# Patient Record
Sex: FEMALE | Race: BLACK OR AFRICAN AMERICAN | URBAN - METROPOLITAN AREA
[De-identification: names, ages, dates, MRNs, and addresses within clinical notes are randomized per-mention and may not be internally consistent; named-entity substitution may affect disease eponyms.]

---

## 2017-03-17 ENCOUNTER — HOSPITAL ENCOUNTER (OUTPATIENT)
Dept: PHYSICAL THERAPY | Age: 72
Discharge: HOME | End: 2017-03-17
Payer: MEDICAID

## 2017-03-17 PROCEDURE — G8979 MOBILITY GOAL STATUS: HCPCS

## 2017-03-17 PROCEDURE — 97110 THERAPEUTIC EXERCISES: CPT

## 2017-03-17 PROCEDURE — 97162 PT EVAL MOD COMPLEX 30 MIN: CPT

## 2017-03-17 PROCEDURE — G8978 MOBILITY CURRENT STATUS: HCPCS

## 2017-03-17 NOTE — PROGRESS NOTES
[address] [facility]  PHYSICAL THERAPY AT [facility] [address], [address] - Phone: [phone]  Fax: [phone] / [address] [facility]  Patient Name: Mehnaz Iverson : 1945   Medical   Diagnosis: Right knee pain [M25.561] Treatment Diagnosis: Right knee pain [M25.561]   Onset Date: 17     Referral Source: Hilaria Holloway,  Start of Care [facility]): 3/17/2017   Prior Hospitalization: See medical history Provider #: [id number]   Prior Level of Function: Chronic R knee pain, ambulating with rollator   Comorbidities: Depression, Diabetes, HBP   Medications: Verified on Patient Summary List   The Plan of Care and following information is based on the information from the initial evaluation.   ===========================================================================================  Assessment / key information:  Pt is a 65 y/o female who presents s/p R TKA 17 followed by HHPT until 3/15/17. She presents wearing immobilizer brace which her daughter reports she was told to wear due to the tendency of the knee to \"bow out. \" Pain ranges from 0-5/10 made worse after prolonged sitting and eased with ice. 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 She had the left hip replaced in , and now wears a heel lift in that shoe due to leg length discrepancy. Upon objective evaluation, pt demonstrates 8 cm swelling in R knee joint, decreased R knee A/PROM ( -8/-6 to 77/95), ambulating with rollator with forward flexed posture and short step length B, decreased strength L hip flex and R knee ext, hypomobile R patella, and tight B hamstrings. Pt requires supervision assistance for safe bed mobility and SBA for transfers and gait. Functional deficits include: walking, car transfers, and steps.  A home exercise program was demonstrated and provided to address the above objective and functional deficits. Pt would benefit from PT to address these deficits for increased functional mobility and QOL.  ===========================================================================================  Eval Complexity: History HIGH Complexity :3+ comorbidities / personal factors will impact the outcome/ POC ;  Examination  HIGH Complexity : 4+ Standardized tests and measures addressing body structure, function, activity limitation and / or participation in recreation ; Presentation MEDIUM Complexity : Evolving with changing characteristics ; Decision Making MEDIUM Complexity : FOTO score of 26-74; Overall Complexity MEDIUM  Problem List: pain affecting function, decrease ROM, decrease strength, edema affecting function, impaired gait/ balance, decrease ADL/ functional abilitiies, decrease activity tolerance, decrease flexibility/ joint mobility and decrease transfer abilities   Treatment Plan may include any combination of the following: Therapeutic exercise, Therapeutic activities, Neuromuscular re-education, Physical agent/modality, Gait/balance training, Manual therapy, Patient education, Self Care training, Functional mobility training, Home safety training and Stair training  Patient / Family readiness to learn indicated by: asking questions, trying to perform skills and interest  Persons(s) to be included in education: patient (P), patient's daughter, patient's nurse  Barriers to Learning/Limitations: None  Measures taken:    Patient Goal (s): \"To get better. \"   Patient self reported health status: good  Rehabilitation Potential: good   Short Term Goals: To be accomplished in  3  weeks:  1. Establish initial HEP and pt compliant with instructions  2. Pt will demonstrate R knee AROM -4 to 90 degrees     Long Term Goals: To be accomplished in  6  weeks:  1. Increase FOTO score to 47 indicating improved function  2. Pt will demonstrate R knee AROM 0-110  3.  Pt will ambulate 500 feet independently using rollator walker for community ambulation  4. Pt will be able to negotiate 3 steps independently   5. Pt will be able to transfer into and out of the car independently     Frequency / Duration:   Patient to be seen  2-3  times per week for 6  weeks:  Patient / Caregiver education and instruction: activity modification and exercises  G-Codes (GP): n/a  Therapist Signature: Ave Truong PT, DPT Date: 5/47/3577   Certification Period: 3/17/17-6/16/17 Time: 3:06 PM   ===========================================================================================  I certify that the above Physical Therapy Services are being furnished while the patient is under my care. I agree with the treatment plan and certify that this therapy is necessary. Physician Signature:        Date:       Time:     Please sign and return to [facility] or you may fax the signed copy to [phone]. Thank you.

## 2017-03-17 NOTE — PROGRESS NOTES
PHYSICAL THERAPY - DAILY TREATMENT NOTE    Patient Name: Lissette Peters        Date: 3/17/2017  : 1945    Patient  Verified: YES  Visit #:     Insurance: Payor: [address] [place] [address] / Plan:     / Product Type: Medicaid /      In time: 2:15 Out time: 3:00   Total Treatment Time: 45     Medicare Time Tracking (below)   Total Timed Codes (min):   1:1 Treatment Time:       TREATMENT AREA/ DIAGNOSIS = Right knee pain [M25.561]    SUBJECTIVE  Pain Level (on 0 to 10 scale):  0  / 10   Medication Changes/New allergies or changes in medical history, any new surgeries or procedures? NO    If yes, update Summary List   Subjective Functional Status/Changes:  []  No changes reported   Pt is a 65 y/o female who presents s/p R TKA 17 followed by HHPT until 3/15/17. She presents wearing immobilizer brace which her daughter reports she was told to wear due to the tendency of the knee to \"bow out. \" Pain ranges from 0-5/10 made worse after prolonged sitting and eased with ice. 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 She had the left hip replaced in , and now wears a heel lift in that shoe due to leg length discrepancy. Prior Level of Function: ambulating with rollator; chronic R knee pain  Current Functional Deficits: walking, car transfers, steps  Pt Goal: \"To get better. \"     OBJECTIVE  Physical Therapy Evaluation - Knee    Posture: [] Varus    [] Valgus    [] Recurvatum        [] Tibial Torsion    [] Foot Supination    [] Foot Pronation    Describe:    Girth Measurements:     Cm at  Cm above joint line   Cm at   Cm below joint line  Cm at joint line   Left     36   Right      44        Gait:  [] Normal    [] Abnormal    [] Antalgic    [] NWB    Device:    Describe: forward flexed, rollator, short step length B    ROM / Strength  [] Unable to assess                  AROM                      PROM Strength (1-5)    Left Right Left Right Left Right   Hip Flexion     2+ 4    Extension          Abduction     5 5    Adduction         Knee Flexion 105 77 120 95 5 5    Extension 0 -8   5 2+   Ankle Plantarflexion          Dorsiflexion     5 5    all strength measured in sitting    Accessory Mobility    Range  End feel    Ant Glide      Post Glide      R A/P unicondylar      L A/P unicondylar      R P/A unicondylar      L P/A unicondylar      Patella      Sup      Inf      Med      Lat  hypo        Flexibility:   Blaise Test:    SLR: tight    Gastroc/Soleus:  Other:    Palpation:     Mild/Mod/Severe  Mild/Mod/Severe   Med. Joint Line  Quad Tendon    Lat. Joint Line  Fibular Head    Tibial Tubercle  Hamstring Tendons    Patella  Pes Anserine    Patellar Lig.   Gerdy's Tubercle      Optional Tests:  Lachmans  [] Neg    [] Pos Posterior Drawer [] Neg    [] Pos  Pivot Shift  [] Neg    [] Pos Posterior Sag  [] Neg    [] Pos  EMILIANA   [] Neg    [] Pos Iza's Test [] Neg    [] Pos  ALRI   [] Neg    [] Pos Squat   [] Neg    [] Pos  Valgus@ 0 Degrees [] Neg    [] Pos Lowell-Josh [] Neg    [] Pos  Valgus@ 30 Degrees [] Neg    [] Pos Patellar Apprehension [] Neg    [] Pos  Varus@ 0 Degrees [] Neg    [] Pos Knowles's Compression [] Neg    [] Pos  Varus@ 30 Degrees [] Neg    [] Pos Ely's Test  [] Neg    [] Pos  Apley's Compression [] Neg    [] Pos Grabiel's Test  [] Neg    [] Pos  Apley's Distraction [] Neg    [] Pos Stroke Test  [] Neg    [] Pos   Anterior Drawer [] Neg    [] Pos Fluctuation Test [] Neg    [] Pos  Other:                  [] Neg    [] Pos               Supervision assist for bed mobility  SBA for ambulation and transfers    OBJECTIVE TREATMENT  Modalities Rationale: [] decrease edema/ inflammation  [] decrease pain   [] increase tissue extensibility  [] increase muscle performance  [] decrease neural compromise  to improve patient's ability to [] perform ADLs   [] ambulate  [] perform work  [] relaxation/ sleep      min [] Estim, type/location:                                      []  att     []  unatt     []  w/US     []  w/ice    []  w/heat    min []  Mechanical Traction: type/lbs                   []  pro   []  sup   []  int   []  cont    []  before manual    []  after manual    min []  Ultrasound, settings/location:      min []  Iontophoresis w/ dexamethasone, location:                                               []  take home patch       []  in clinic    min []  Ice     []  Heat    location/position:     min []  Vasopneumatic Device, press/temp:     min []  Other:    [] Skin assessment post-treatment (if applicable):    []  intact    []  redness- no adverse reaction     []redness  adverse reaction:         10 min Therapeutic Exercise:  [x]  See flow sheet   Rationale:  [x] increase ROM   [x] increase strength   [] increase endurance   [] increase motor control   [] other  to improve patients ability to [x] perform ADLs   [x] ambulate  [] perform work  [] relaxation/ sleep       min Manual Therapy: Technique:          Rationale: [] decrease pain   [] decrease TP [] increase ROM/ mobility   [] increase tissue extensibility   [] decrease edema   [] reduce disc [] postural correction   [] other     to improve patient's ability to [] perform ADLs   [] ambulate  [] perform work  [] relaxation/ sleep       min Therapeutic Activity:  [] see flow sheet   Rationale:[] increase ROM   [] increase strength   [] increase balance/ proprioception   [] increase motor control   [] other   to improve patients ability to [] perform ADLs   [] ambulate  [] perform work  [] relaxation/ sleep      min Neuromuscular Re-ed:  [] see flow sheet   Rationale:[] increase ROM   [] increase strength   [] increase balance/ proprioception   [] increase motor control  [] improve safety  [] other    to improve patients ability to[] perform ADLs   [] ambulate  [] perform work  [] relaxation/ sleep                min Gait Training: To improve patients ability to:    [] perform ADLs   [] ambulate       min Patient Education:  Yes    [x] Reviewed HEP   []  Progressed/Changed HEP based on: Other Objective/Functional Measures:       Post Treatment Pain Level (on 0 to 10) scale:   0  / 10     ASSESSMENT  []  See Progress Note/Recertification      Patient will continue to benefit from skilled PT services to modify and progress therapeutic interventions, address functional mobility deficits, address ROM deficits, address strength deficits, analyze and address soft tissue restrictions, analyze and cue movement patterns, analyze and modify body mechanics/ergonomics, assess and modify postural abnormalities and instruct in home and community integration to attain remaining goals. Progress toward goals / Updated goals:    [] decr pain   []inc stability   []inc balance [] inc ROM[] inc strength  [] centralizing symptoms                    [] progressing  Function  [] progressing towards LTGs            []  progressing HEP       PLAN  [x]  Upgrade activities as tolerated YES Continue plan of care   []  Discharge due to :    []  Other:      Therapist: Luis Valerio PT    Date: 3/17/2017 Time: 2:14 PM        No future appointments.

## 2017-03-20 ENCOUNTER — HOSPITAL ENCOUNTER (OUTPATIENT)
Dept: PHYSICAL THERAPY | Age: 72
Discharge: HOME | End: 2017-03-20
Payer: MEDICAID

## 2017-03-20 PROCEDURE — 97110 THERAPEUTIC EXERCISES: CPT

## 2017-03-20 PROCEDURE — 97140 MANUAL THERAPY 1/> REGIONS: CPT

## 2017-03-20 NOTE — PROGRESS NOTES
PHYSICAL THERAPY - DAILY TREATMENT NOTE    Patient Name: Unknown Half        Date: 3/20/2017  : 1945   YES Patient  Verified  Visit #:   2   of   12  Insurance: Payor: [place] / Plan: 1500 / Product Type: Medicaid /      In time: 10 Out time: 11   Total Treatment Time: 60     TREATMENT AREA =  Right knee pain [M25.561]    SUBJECTIVE  Pain Level (on 0 to 10 scale):  0  / 10   Medication Changes/New allergies or changes in medical history, any new surgeries or procedures?     NO    If yes, update Summary List   Subjective Functional Status/Changes:  []  No changes reported     Functional improvements: can walker farther  Functional impairments: swelling, knee extension        OBJECTIVE  Modalities Rationale:     decrease edema, decrease inflammation and decrease pain to improve patient's ability to prolong walking   min [] Estim, type/location:                                      []  att     []  unatt     []  w/US     []  w/ice    []  w/heat    min []  Mechanical Traction: type/lbs                   []  pro   []  sup   []  int   []  cont    []  before manual    []  after manual    min []  Ultrasound, settings/location:      min []  Iontophoresis w/ dexamethasone, location:                                               []  take home patch       []  in clinic    min []  Ice     []  Heat    location/position:     min []  Vasopneumatic Device, press/temp:     min []  Other:    [] Skin assessment post-treatment (if applicable):    []  intact    []  redness- no adverse reaction     []redness  adverse reaction:        15 min Manual Therapy: Technique:      [] S/DTM []IASTM []PROM [] Passive Stretching   []manual TPR    []Jt manipulation:Gr I [] II []  III [] IV[] V[]  Treatment Area:     Rationale:      decrease pain, increase ROM and increase tissue extensibility to improve patient's ability to prolong walking     45 min Therapeutic Exercise:  [x]  See flow sheet   Rationale: increase ROM, increase strength, improve coordination and improve balance to improve the patients ability to prolong walking         min Neuromuscular Re-ed:    Rationale:    increase ROM to improve the patients ability to          min Therapeutic Activity:    Rationale:    increase ROM to improve the patients ability to         min Gait Training:    Rationale:         min Patient Education:  YES  Reviewed HEP   []  Progressed/Changed HEP based on: Other Objective/Functional Measures: Added heel prop, supine hip abd, glute sets, ROM, MSR  Gait training for knee ext, heel strike      Post Treatment Pain Level (on 0 to 10) scale:   0  / 10     ASSESSMENT  Assessment/Changes in Function:     Patient tolerated treatment well today     []  See Progress Note/Recertification   Patient will continue to benefit from skilled PT services to modify and progress therapeutic interventions, address functional mobility deficits, address ROM deficits, address strength deficits and analyze and address soft tissue restrictions to attain remaining goals.    Progress toward goals / Updated goals:    Established initial HEP     PLAN  []  Upgrade activities as tolerated YES Continue plan of care   []  Discharge due to :    []  Other:      Therapist: Andria Burleson PTA    Date: 3/20/2017 Time: 3:12 PM     Future Appointments  Date Time Provider Michael Jesus   3/22/2017 12:00 PM Jayme Mahoney PT [facility]   3/27/2017 12:00 PM Andria Burleson PTA [facility]   3/29/2017 10:30 AM Andria Burleson PTA [facility]   3/31/2017 1:30 PM Andria Burleson PTA [facility]   4/3/2017 11:00 AM Jayme Mahoney PT [facility]   4/5/2017 11:30 AM Andria Burleson PTA [facility]   4/7/2017 12:00 PM Jayme Mahoney PT [facility]   4/10/2017 12:30 PM Jayme Maohney PT [facility]   4/12/2017 11:30 AM Andria Burleson PTA [facility]   4/14/2017 12:00 PM Stratford Harps, PT [facility] AT [facility]

## 2017-03-22 ENCOUNTER — HOSPITAL ENCOUNTER (OUTPATIENT)
Dept: PHYSICAL THERAPY | Age: 72
Discharge: HOME | End: 2017-03-22
Payer: MEDICAID

## 2017-03-22 PROCEDURE — 97140 MANUAL THERAPY 1/> REGIONS: CPT

## 2017-03-22 PROCEDURE — 97110 THERAPEUTIC EXERCISES: CPT

## 2017-03-22 NOTE — PROGRESS NOTES
PHYSICAL THERAPY - DAILY TREATMENT NOTE      Patient Name: Gal Cruz        Date: 3/22/2017  : 1945   YES Patient  Verified  Visit #:   3   of     Insurance: Payor: [employer] / Plan: [employer] / Product Type: Managed Care Medicaid /      In time: 12:00 Out time: 1:00   Total Treatment Time: 60     Medicare Time Tracking (below)   Total Timed Codes (min):   1:1 Treatment Time:       TREATMENT AREA = Right knee pain [M25.561]    SUBJECTIVE    Pain Level (on 0 to 10 scale):  0  / 10   Medication Changes/New allergies or changes in medical history, any new surgeries or procedures? NO    If yes, update Summary List   Subjective Functional Status/Changes:  []  No changes reported     \"[place] No pain today or after last time. \"       OBJECTIVE    35 min Therapeutic Exercise:  [x]  See flow sheet   Rationale:      increase ROM, increase strength, improve coordination, improve balance and increase proprioception to improve the patients ability to walk       15 min Manual Therapy:  DTM R hamstrings, gastroc, quad, patellar mobs, PROM flex and ext, retrograde massage   Rationale:      decrease pain, increase ROM and increase tissue extensibility to improve patient's ability to walk    Modalities Rationale:     decrease edema and decrease pain to improve patient's ability to walk      min [] Estim, type/location:                                      []  att     []  unatt     []  w/US     []  w/ice    []  w/heat    min []  Mechanical Traction: type/lbs                   []  pro   []  sup   []  int   []  cont    []  before manual    []  after manual    min []  Ultrasound, settings/location:      min []  Iontophoresis w/ dexamethasone, location:                                               []  take home patch       []  in clinic   10 min [x]  Ice     []  Heat    location/position: R knee; supine    min []  Vasopneumatic Device, press/temp:     min []  Other:    [] Skin assessment post-treatment (if applicable):    [x]  intact    [x]  redness- no adverse reaction     []redness  adverse reaction:       min Patient Education:  YES  Reviewed HEP   []  Progressed/Changed HEP based on: Other Objective/Functional Measures:    Extension slightly improved from initial eval     Post Treatment Pain Level (on 0 to 10) scale:   0  / 10     ASSESSMENT    Assessment/Changes in Function:     Pt continues to position R hip in ER making knee ext appear greater than it actually is. Verbal cues to correct this during all exercises. []  See Progress Note/Recertification   Patient will continue to benefit from skilled PT services to modify and progress therapeutic interventions, address functional mobility deficits, address ROM deficits, address strength deficits, analyze and address soft tissue restrictions, analyze and cue movement patterns, analyze and modify body mechanics/ergonomics, assess and modify postural abnormalities and instruct in home and community integration to attain remaining goals. to attain remaining goals.    Progress toward goals / Updated goals:    Compliant with exercises and walking at home with nurse     PLAN    []  Upgrade activities as tolerated YES Continue plan of care   []  Discharge due to :    []  Other:      Therapist: Luis Valerio PT    Date: 3/22/2017 Time: 12:04 PM   Future Appointments  Date Time Provider Michael Jesus   3/27/2017 12:00 PM Dollene Reusing, PTA [facility]   3/29/2017 10:30 AM Dollene Reusing, PTA [facility]   3/31/2017 1:30 PM Dollene Reusing, PTA [facility]   4/3/2017 11:00 AM Luis Valerio, PT [facility]   4/5/2017 11:30 AM Dollene Reusing, PTA [facility]   4/7/2017 12:00 PM Luis Valerio PT [facility]   4/10/2017 12:30 PM Luis Valerio PT [facility]   4/12/2017 11:30 AM Dollene Reusing, PTA [facility]   4/14/2017 12:00 PM Luis Valerio, PT [facility]

## 2017-03-27 ENCOUNTER — HOSPITAL ENCOUNTER (OUTPATIENT)
Dept: PHYSICAL THERAPY | Age: 72
Discharge: HOME | End: 2017-03-27
Payer: MEDICAID

## 2017-03-27 PROCEDURE — 97110 THERAPEUTIC EXERCISES: CPT

## 2017-03-27 PROCEDURE — 97112 NEUROMUSCULAR REEDUCATION: CPT

## 2017-03-27 PROCEDURE — 97140 MANUAL THERAPY 1/> REGIONS: CPT

## 2017-03-27 NOTE — PROGRESS NOTES
PHYSICAL THERAPY - DAILY TREATMENT NOTE    Patient Name: Mehnaz Iverson        Date: 3/27/2017  : 1945   YES Patient  Verified  Visit #:     Insurance: Payor: BLUE CROSS MEDICAID / Plan: [facility] HEALTHKEEPERS PLUS / Product Type: Managed Care Medicaid /      In time: 12 Out time: 2456   Total Treatment Time: 55     TREATMENT AREA =  Right knee pain [M25.561]    SUBJECTIVE  Pain Level (on 0 to 10 scale):  0  / 10   Medication Changes/New allergies or changes in medical history, any new surgeries or procedures?     NO    If yes, update Summary List   Subjective Functional Status/Changes:  []  No changes reported     Functional improvements: can walker farther  Functional impairments: My left hip has been bothering me       OBJECTIVE  Modalities Rationale:     decrease edema, decrease inflammation and decrease pain to improve patient's ability to prolong walking   min [] Estim, type/location:                                      []  att     []  unatt     []  w/US     []  w/ice    []  w/heat    min []  Mechanical Traction: type/lbs                   []  pro   []  sup   []  int   []  cont    []  before manual    []  after manual    min []  Ultrasound, settings/location:      min []  Iontophoresis w/ dexamethasone, location:                                               []  take home patch       []  in clinic    min []  Ice     []  Heat    location/position:     min []  Vasopneumatic Device, press/temp:     min []  Other:    [] Skin assessment post-treatment (if applicable):    []  intact    []  redness- no adverse reaction     []redness  adverse reaction:        15 min Manual Therapy: Technique:      [] S/DTM []IASTM []PROM [] Passive Stretching   []manual TPR    []Jt manipulation:Gr I [] II []  III [] IV[] V[]  Treatment Area:     Rationale:      decrease pain, increase ROM and increase tissue extensibility to improve patient's ability to prolong walking     45 min Therapeutic Exercise:  [x] See flow sheet   Rationale:      increase ROM, increase strength, improve coordination and improve balance to improve the patients ability to prolong walking         min Neuromuscular Re-ed:    Rationale:    increase ROM to improve the patients ability to          min Therapeutic Activity:    Rationale:    increase ROM to improve the patients ability to         min Gait Training:    Rationale:         min Patient Education:  YES  Reviewed HEP   []  Progressed/Changed HEP based on: Other Objective/Functional Measures:    Improved knee extension since last visit   Slower feliberto today  Gait training for knee ext, heel strike      Post Treatment Pain Level (on 0 to 10) scale:   0  / 10     ASSESSMENT  Assessment/Changes in Function:     Patient tolerated treatment well today     []  See Progress Note/Recertification   Patient will continue to benefit from skilled PT services to modify and progress therapeutic interventions, address functional mobility deficits, address ROM deficits, address strength deficits and analyze and address soft tissue restrictions to attain remaining goals.    Progress toward goals / Updated goals:    Established initial HEP     PLAN  []  Upgrade activities as tolerated YES Continue plan of care   []  Discharge due to :    []  Other:      Therapist: Yobani Hernandez PTA    Date: 3/27/2017 Time: 3:12 PM     Future Appointments  Date Time Provider Michael Jesus   3/29/2017 10:30 AM Yobani Hernandez PTA [facility]   3/31/2017 1:30 PM Yobani Hernandez PTA [facility]   4/3/2017 11:00 AM Mary Gonsales PT [facility]   4/5/2017 11:30 AM Yobani Hernandez PTA [facility]   4/7/2017 12:00 PM Mary Gonsales PT [facility]   4/10/2017 12:30 PM Mary Gonsales PT [facility]   4/12/2017 11:30 AM Yobani Hernandez PTA [facility]   4/14/2017 12:00 PM Mary Gonsales PT [facility]

## 2017-03-29 ENCOUNTER — HOSPITAL ENCOUNTER (OUTPATIENT)
Dept: PHYSICAL THERAPY | Age: 72
Discharge: HOME | End: 2017-03-29
Payer: MEDICAID

## 2017-03-29 PROCEDURE — 97110 THERAPEUTIC EXERCISES: CPT

## 2017-03-29 PROCEDURE — 97112 NEUROMUSCULAR REEDUCATION: CPT

## 2017-03-29 PROCEDURE — 97140 MANUAL THERAPY 1/> REGIONS: CPT

## 2017-03-29 NOTE — PROGRESS NOTES
PHYSICAL THERAPY - DAILY TREATMENT NOTE    Patient Name: Shon Ybarra        Date: 3/29/2017  : 1945   YES Patient  Verified  Visit #:      12  Insurance: Payor: BLUE CROSS MEDICAID / Plan: [facility] HEALTHKEEPERS PLUS / Product Type: Managed Care Medicaid /      In time: 8524 Out time: 1140   Total Treatment Time: 50     TREATMENT AREA =  Right knee pain [M25.561]    SUBJECTIVE  Pain Level (on 0 to 10 scale):  0  / 10   Medication Changes/New allergies or changes in medical history, any new surgeries or procedures?     NO    If yes, update Summary List   Subjective Functional Status/Changes:  []  No changes reported     Functional improvements: can walk farther, left hip hasn't been bothering me as much   Functional impairments: My left hip still hurts sometimes        OBJECTIVE  Modalities Rationale:     decrease edema, decrease inflammation and decrease pain to improve patient's ability to prolong walking   min [] Estim, type/location:                                      []  att     []  unatt     []  w/US     []  w/ice    []  w/heat    min []  Mechanical Traction: type/lbs                   []  pro   []  sup   []  int   []  cont    []  before manual    []  after manual    min []  Ultrasound, settings/location:      min []  Iontophoresis w/ dexamethasone, location:                                               []  take home patch       []  in clinic    min []  Ice     []  Heat    location/position:     min []  Vasopneumatic Device, press/temp:     min []  Other:    [] Skin assessment post-treatment (if applicable):    []  intact    []  redness- no adverse reaction     []redness  adverse reaction:        15 min Manual Therapy: Technique:      [] S/DTM []IASTM []PROM [] Passive Stretching   []manual TPR    []Jt manipulation:Gr I [] II []  III [] IV[] V[]  Treatment Area:     Rationale:      decrease pain, increase ROM and increase tissue extensibility to improve patient's ability to prolong walking     35 min Therapeutic Exercise:  [x]  See flow sheet   Rationale:      increase ROM, increase strength, improve coordination and improve balance to improve the patients ability to prolong walking         min Neuromuscular Re-ed:    Rationale:    increase ROM to improve the patients ability to          min Therapeutic Activity:    Rationale:    increase ROM to improve the patients ability to         min Gait Training:    Rationale:         min Patient Education:  YES  Reviewed HEP   []  Progressed/Changed HEP based on: Other Objective/Functional Measures:    Improved knee extension since last visit   Slower feliberto today  Gait training for knee ext, heel strike      Post Treatment Pain Level (on 0 to 10) scale:   0  / 10     ASSESSMENT  Assessment/Changes in Function:     Patient tolerated treatment well today     []  See Progress Note/Recertification   Patient will continue to benefit from skilled PT services to modify and progress therapeutic interventions, address functional mobility deficits, address ROM deficits, address strength deficits and analyze and address soft tissue restrictions to attain remaining goals.    Progress toward goals / Updated goals:    Established initial HEP     PLAN  []  Upgrade activities as tolerated YES Continue plan of care   []  Discharge due to :    []  Other:      Therapist: Nirmala Horn PTA    Date: 3/29/2017 Time: 3:12 PM     Future Appointments  Date Time Provider Michael Jesus   3/31/2017 1:30 PM Nirmala Horn PTA [facility]   4/3/2017 11:00 AM La Nena Mcmanus PT [facility]   4/5/2017 11:30 AM Nirmala Horn PTA [facility]   4/7/2017 12:00 PM La Nena Mcmanus PT [facility]   4/10/2017 12:30 PM La Nena Mcmanus PT [facility]   4/12/2017 11:30 AM Nirmala Horn PTA [facility]   4/14/2017 12:00 PM La Nena Mcmanus PT [facility] AT [facility]

## 2017-03-31 ENCOUNTER — HOSPITAL ENCOUNTER (OUTPATIENT)
Dept: PHYSICAL THERAPY | Age: 72
Discharge: HOME | End: 2017-03-31
Payer: MEDICAID

## 2017-03-31 PROCEDURE — 97140 MANUAL THERAPY 1/> REGIONS: CPT

## 2017-03-31 PROCEDURE — 97112 NEUROMUSCULAR REEDUCATION: CPT

## 2017-03-31 PROCEDURE — 97110 THERAPEUTIC EXERCISES: CPT

## 2017-03-31 NOTE — PROGRESS NOTES
PHYSICAL THERAPY - DAILY TREATMENT NOTE    Patient Name: Carine Roque        Date: 3/31/2017  : 1945   YES Patient  Verified  Visit #:     Insurance: Payor: Maegna Or / Plan: [address] [place] [address] / Product Type: Managed Care Medicaid /      In time: 135 Out time: 235   Total Treatment Time: 60     TREATMENT AREA =  Right knee pain [M25.561]    SUBJECTIVE  Pain Level (on 0 to 10 scale):  0  / 10   Medication Changes/New allergies or changes in medical history, any new surgeries or procedures?     NO    If yes, update Summary List   Subjective Functional Status/Changes:  []  No changes reported     Functional improvements:   Functional impairments: My left hip still hurts sometimes        OBJECTIVE  Modalities Rationale:     decrease edema, decrease inflammation and decrease pain to improve patient's ability to prolong walking   min [] Estim, type/location:                                      []  att     []  unatt     []  w/US     []  w/ice    []  w/heat    min []  Mechanical Traction: type/lbs                   []  pro   []  sup   []  int   []  cont    []  before manual    []  after manual    min []  Ultrasound, settings/location:      min []  Iontophoresis w/ dexamethasone, location:                                               []  take home patch       []  in clinic   10 min [x]  Ice     []  Heat    location/position:     min []  Vasopneumatic Device, press/temp:     min []  Other:    [] Skin assessment post-treatment (if applicable):    []  intact    []  redness- no adverse reaction     []redness  adverse reaction:        15 min Manual Therapy: Technique:      [] S/DTM []IASTM []PROM [] Passive Stretching   []manual TPR    []Jt manipulation:Gr I [] II []  III [] IV[] V[]  Treatment Area:     Rationale:      decrease pain, increase ROM and increase tissue extensibility to improve patient's ability to prolong walking     35 min Therapeutic Exercise:  [x]  See flow sheet   Rationale:      increase ROM, increase strength, improve coordination and improve balance to improve the patients ability to prolong walking         min Neuromuscular Re-ed:    Rationale:    increase ROM to improve the patients ability to          min Therapeutic Activity:    Rationale:    increase ROM to improve the patients ability to         min Gait Training:    Rationale:         min Patient Education:  YES  Reviewed HEP   []  Progressed/Changed HEP based on: Other Objective/Functional Measures:    Improved knee extension since last visit   Slower feliberto today  Gait training for knee ext, heel strike      Post Treatment Pain Level (on 0 to 10) scale:   0  / 10     ASSESSMENT  Assessment/Changes in Function:     Patient tolerated treatment well today     []  See Progress Note/Recertification   Patient will continue to benefit from skilled PT services to modify and progress therapeutic interventions, address functional mobility deficits, address ROM deficits, address strength deficits and analyze and address soft tissue restrictions to attain remaining goals.    Progress toward goals / Updated goals:    Established initial HEP     PLAN  []  Upgrade activities as tolerated YES Continue plan of care   []  Discharge due to :    []  Other:      Therapist: Bartolo García PTA    Date: 3/31/2017 Time: 3:12 PM     Future Appointments  Date Time Provider Micahel Jesus   4/3/2017 11:00 AM Sosa Templeton PT [facility]   4/5/2017 11:30 AM Bartolo García PTA [facility]   4/7/2017 12:00 PM Sosa Templeton PT [facility]   4/10/2017 12:30 PM Sosa Templeton PT [facility]   4/12/2017 11:30 AM Bartolo García PTA [facility]   4/14/2017 12:00 PM Sosa Templeton PT [facility]

## 2017-04-03 ENCOUNTER — HOSPITAL ENCOUNTER (OUTPATIENT)
Dept: PHYSICAL THERAPY | Age: 72
Discharge: HOME | End: 2017-04-03
Payer: MEDICAID

## 2017-04-03 PROCEDURE — 97140 MANUAL THERAPY 1/> REGIONS: CPT

## 2017-04-03 PROCEDURE — 97110 THERAPEUTIC EXERCISES: CPT

## 2017-04-03 NOTE — PROGRESS NOTES
PHYSICAL THERAPY - DAILY TREATMENT NOTE      Patient Name: Zoraida Horowitz        Date: 4/3/2017  : 1945   YES Patient  Verified  Visit #:     Insurance: Payor: BLUE CROSS MEDICAID / Plan: [facility] [employer]ALTHKEEPERS PLUS / Product Type: Managed Care Medicaid /      In time: 10:55 Out time: 11:45   Total Treatment Time: 50     Medicare Time Tracking (below)   Total Timed Codes (min):   1:1 Treatment Time:       TREATMENT AREA = Right knee pain [M25.561]    SUBJECTIVE    Pain Level (on 0 to 10 scale):  0  / 10   Medication Changes/New allergies or changes in medical history, any new surgeries or procedures? NO    If yes, update Summary List   Subjective Functional Status/Changes:  []  No changes reported     \"Just a little bit of pain. \"       OBJECTIVE    35 min Therapeutic Exercise:  [x]  See flow sheet   Rationale:      increase ROM, increase strength, improve coordination, improve balance and increase proprioception to improve the patients ability to walk       15 min Manual Therapy:  DTM R quads, hamstrings, gastroc, patellar mobs, PROM flex and ext   Rationale:      decrease pain, increase ROM, increase tissue extensibility and decrease edema  to improve patient's ability to walk       min Patient Education:  YES  Reviewed HEP   []  Progressed/Changed HEP based on: Other Objective/Functional Measures:    Able to achieve full PROM ext; AROM ext improving but lacking TKE     Post Treatment Pain Level (on 0 to 10) scale:   0  / 10     ASSESSMENT    Assessment/Changes in Function:     Increased balance exercises to SR. Pt able to maintain MSR at bunion 30\" each way.      []  See Progress Note/Recertification   Patient will continue to benefit from skilled PT services to modify and progress therapeutic interventions, address functional mobility deficits, address ROM deficits, address strength deficits, analyze and address soft tissue restrictions, analyze and cue movement patterns, analyze and modify body mechanics/ergonomics, assess and modify postural abnormalities and instruct in home and community integration to attain remaining goals. to attain remaining goals.    Progress toward goals / Updated goals:    Progressing towards all goals     PLAN    []  Upgrade activities as tolerated YES Continue plan of care   []  Discharge due to :    [x]  Other: MD appointment on  and pt will ask about brace wear     Therapist: Kim Love PT    Date: 4/3/2017 Time: 10:57 AM   Future Appointments  Date Time Provider Michael Jesus   4/3/2017 11:00 AM Kim Love PT [facility]   2017 11:30 AM Emeterio Neri, PTA [facility]   2017 12:00 PM Kim Love PT [facility]   4/10/2017 12:30 PM Kmi Love PT [facility]   2017 11:30 AM Emeterio Neri, PTA [facility]   2017 12:00 PM Kim Love PT [facility]

## 2017-04-05 ENCOUNTER — HOSPITAL ENCOUNTER (OUTPATIENT)
Dept: PHYSICAL THERAPY | Age: 72
Discharge: HOME | End: 2017-04-05
Payer: MEDICAID

## 2017-04-05 PROCEDURE — 97112 NEUROMUSCULAR REEDUCATION: CPT

## 2017-04-05 PROCEDURE — 97140 MANUAL THERAPY 1/> REGIONS: CPT

## 2017-04-05 PROCEDURE — 97110 THERAPEUTIC EXERCISES: CPT

## 2017-04-05 NOTE — PROGRESS NOTES
PHYSICAL THERAPY - DAILY TREATMENT NOTE    Patient Name: Anthony Payer        Date: 2017  : 1945   YES Patient  Verified  Visit #:     Insurance: Payor: BLUE CROSS MEDICAID / Plan: [facility] HEALTHKEEPERS PLUS / Product Type: Managed Care Medicaid /      In time: 827 Out time:    Total Treatment Time: 70     TREATMENT AREA =  Right knee pain [M25.561]    SUBJECTIVE  Pain Level (on 0 to 10 scale):  0  / 10   Medication Changes/New allergies or changes in medical history, any new surgeries or procedures?     NO    If yes, update Summary List   Subjective Functional Status/Changes:  []  No changes reported     Functional improvements:   Functional impairments: My left hip still hurts sometimes        OBJECTIVE  Modalities Rationale:     decrease edema, decrease inflammation and decrease pain to improve patient's ability to prolong walking   min [] Estim, type/location:                                      []  att     []  unatt     []  w/US     []  w/ice    []  w/heat    min []  Mechanical Traction: type/lbs                   []  pro   []  sup   []  int   []  cont    []  before manual    []  after manual    min []  Ultrasound, settings/location:      min []  Iontophoresis w/ dexamethasone, location:                                               []  take home patch       []  in clinic   10 min [x]  Ice     []  Heat    location/position:     min []  Vasopneumatic Device, press/temp:     min []  Other:    [] Skin assessment post-treatment (if applicable):    []  intact    []  redness- no adverse reaction     []redness  adverse reaction:        15 min Manual Therapy: Technique:      [] S/DTM []IASTM []PROM [] Passive Stretching   []manual TPR    []Jt manipulation:Gr I [] II []  III [] IV[] V[]  Treatment Area:     Rationale:      decrease pain, increase ROM and increase tissue extensibility to improve patient's ability to prolong walking     45 min Therapeutic Exercise:  [x]  See flow sheet   Rationale:      increase ROM, increase strength, improve coordination and improve balance to improve the patients ability to prolong walking         min Neuromuscular Re-ed:    Rationale:    increase ROM to improve the patients ability to          min Therapeutic Activity:    Rationale:    increase ROM to improve the patients ability to         min Gait Training:    Rationale:         min Patient Education:  YES  Reviewed HEP   []  Progressed/Changed HEP based on: Other Objective/Functional Measures:      Patient told tech after treatment session that she hated the brace and was not going to put it back on       Post Treatment Pain Level (on 0 to 10) scale:   0  / 10     ASSESSMENT  Assessment/Changes in Function:     Patient tolerated treatment well today     []  See Progress Note/Recertification   Patient will continue to benefit from skilled PT services to modify and progress therapeutic interventions, address functional mobility deficits, address ROM deficits, address strength deficits and analyze and address soft tissue restrictions to attain remaining goals.    Progress toward goals / Updated goals:    Established initial HEP     PLAN  []  Upgrade activities as tolerated YES Continue plan of care   []  Discharge due to :    []  Other:      Therapist: Farzaneh Noble PTA    Date: 4/5/2017 Time: 3:12 PM     Future Appointments  Date Time Provider Michael Jesus   4/7/2017 12:00 PM Sagrario Astorga PT [facility]   4/10/2017 12:30 PM Sagrario Astorga PT [facility]   4/12/2017 11:30 AM Farzaneh Noble PTA [facility]   4/14/2017 12:00 PM Sagrario Astorga PT [facility]

## 2017-04-07 ENCOUNTER — HOSPITAL ENCOUNTER (OUTPATIENT)
Dept: PHYSICAL THERAPY | Age: 72
Discharge: HOME | End: 2017-04-07
Payer: MEDICAID

## 2017-04-07 PROCEDURE — 97110 THERAPEUTIC EXERCISES: CPT

## 2017-04-07 PROCEDURE — 97116 GAIT TRAINING THERAPY: CPT

## 2017-04-07 PROCEDURE — 97140 MANUAL THERAPY 1/> REGIONS: CPT

## 2017-04-07 NOTE — PROGRESS NOTES
PHYSICAL THERAPY - DAILY TREATMENT NOTE  8-    Patient Name: Nestor Connor        Date: 2017  : 1945   YES Patient  Verified  Visit #:     Insurance: Payor: BLUE CROSS MEDICAID / Plan: [facility] [employer]KEEPERS PLUS / Product Type: Managed Care Medicaid /      In time: 12:05 Out time: 12:55   Total Treatment Time: 50     Medicare Time Tracking (below)   Total Timed Codes (min):  50 1:1 Treatment Time:  40     TREATMENT AREA = Right knee pain [M25.561]    SUBJECTIVE    Pain Level (on 0 to 10 scale):  0  / 10   Medication Changes/New allergies or changes in medical history, any new surgeries or procedures? NO    If yes, update Summary List   Subjective Functional Status/Changes:  []  No changes reported     \"I feel like it's getting better all the time. \"       OBJECTIVE    30/20 min Therapeutic Exercise:  [x]  See flow sheet   Rationale:      increase ROM, increase strength, improve coordination, improve balance and increase proprioception to improve the patients ability to walk       10 min Manual Therapy:  patellar mobs, DTM gastroc, hamstrings, quad, PROM ext   Rationale:      decrease pain, increase ROM and increase tissue extensibility to improve patient's ability to walk      10 min Gait Trainin' with SPC; verbal cues for sequencing, step length   Rationale:         min Patient Education:  YES  Reviewed HEP   []  Progressed/Changed HEP based on: Other Objective/Functional Measures:    Pt educated in self PROM for knee extension-applying pressure to distal quad in long sitting position. Post Treatment Pain Level (on 0 to 10) scale:   0  / 10     ASSESSMENT    Assessment/Changes in Function:     Improved ambulation with pt able to walk 50' with SPC-required CGA. Pt demonstrates lateral trunk lean due to weakness and brace being locked in extension. Pt encouraged to walk short distances with SPC in the home or driveway but continue to use rollator in community. []  See Progress Note/Recertification   Patient will continue to benefit from skilled PT services to modify and progress therapeutic interventions, address functional mobility deficits, address ROM deficits, address strength deficits, analyze and address soft tissue restrictions, analyze and cue movement patterns, analyze and modify body mechanics/ergonomics, assess and modify postural abnormalities and instruct in home and community integration to attain remaining goals. to attain remaining goals.    Progress toward goals / Updated goals:    Slow progress with maintaining R knee extension     PLAN    []  Upgrade activities as tolerated YES Continue plan of care   []  Discharge due to :    [x]  Other: MD note next session     Therapist: Chioma Graham PT    Date: 4/7/2017 Time: 12:05 PM   Future Appointments  Date Time Provider Michael Jesus   4/10/2017 12:30 PM Chioma Graham PT [facility]   4/12/2017 11:30 AM Lola Hoover, PTA [facility]   4/14/2017 12:00 PM Chioma Graham PT [facility]   4/17/2017 12:30 PM Lola Hoover, PTA [facility]   4/19/2017 12:00 PM Lola Hoover, PTA [facility]   4/21/2017 12:00 PM Chioma Graham PT [facility]   4/24/2017 10:30 AM Chioma Graham PT [facility]   4/26/2017 12:30 PM Chioma Graham, PT [facility]

## 2017-04-10 ENCOUNTER — HOSPITAL ENCOUNTER (OUTPATIENT)
Dept: PHYSICAL THERAPY | Age: 72
Discharge: HOME | End: 2017-04-10
Payer: MEDICAID

## 2017-04-10 PROCEDURE — 97110 THERAPEUTIC EXERCISES: CPT

## 2017-04-10 PROCEDURE — 97140 MANUAL THERAPY 1/> REGIONS: CPT

## 2017-04-10 PROCEDURE — 97112 NEUROMUSCULAR REEDUCATION: CPT

## 2017-04-10 NOTE — PROGRESS NOTES
[address] PHYSICAL THERAPY AT [address] [facility] [place] [address], [address] [place] [address], [address], [address] [place] [address]  Phone: [phone]  Fax: [phone]  PROGRESS NOTE  Patient Name: Cesar Del Cid : 1945   Treatment/Medical Diagnosis: Right knee pain [M25.561]   Referral Source: Richar Arreaga DO     Date of Initial Visit: 3/17/17 Attended Visits: 10 Missed Visits: 0     SUMMARY OF TREATMENT  Physical therapy has consisted of therapeutic exercise and neuromuscular re-education for L knee ROM, LE strengthening, balance, and gait training, manual therapy including STM, DTM, joint mobs, PROm, pt education for activity modification, gait mechanics, and HEP. CURRENT STATUS  Pt has made slow, steady progress with above POC. She continues to lack full R knee extension, which makes ambulation difficult. She is wearing immobilizer brace locked at zero for ambulation. She was able to ambulate 48' with [facility] requiring verbal cues for sequencing and step length. She reports 60-70% overall improvement at this time. Pt would benefit from continued PT to restore ROM and strength to WNL in R LE to facilitate community ambulation and stairs. Previous Goals:  1. Establish initial HEP and pt compliant with instructions  2. Pt will demonstrate R knee AROM -4 to 90 degrees  3. Increase FOTO score to 47 indicating improved function     Prior Level/Current Level:  1) Prior Level: n/a   Current Level: pt inconsistent with HEP   Goal Met? ongoing  2) Prior Level: -8 to 77   Current Level: -3 to 95 degrees   Goal Met? yes  3) Prior Level: 27   Current Level: 49   Goal Met? yes    New Goals to be achieved in __4__  weeks:  1. Pt will demonstrate R knee AROM 0-110  2. Pt will ambulate 500 feet independently using rollator walker for community ambulation  3. Pt will be able to negotiate 3 steps independently   4.  Pt will be able to transfer into and out of the car independently     G-Codes: n/a  RECOMMENDATIONS  Continue PT 2x/week for 4 weeks. Please advise on brace wear. Thank you! If you have any questions/comments please contact us directly at [phone]. Thank you for allowing us to assist in the care of your patient. Therapist Signature: Marcus Young PT, DPT Date: 4/10/2017     Time: 12:07 PM   NOTE TO PHYSICIAN:  PLEASE COMPLETE THE ORDERS BELOW AND FAX TO   [facility] at [address]: [phone]. If you are unable to process this request in 24 hours please contact our office: [phone].    ___ I have read the above report and request that my patient continue as recommended.   ___ I have read the above report and request that my patient continue therapy with the following changes/special instructions:_________________________________________________________   ___ I have read the above report and request that my patient be discharged from therapy.      Physician Signature:        Date:       Time:

## 2017-04-10 NOTE — PROGRESS NOTES
PHYSICAL THERAPY - DAILY TREATMENT NOTE      Patient Name: Ronald Polanco        Date: 4/10/2017  : 1945   YES Patient  Verified  Visit #:   10   of   12  Insurance: Payor: [employer] MEDICAID / [place] [employer] / Product Type: Managed Care Medicaid /      In time: 12:15 Out time: 12:15   Total Treatment Time: 55     Medicare Time Tracking (below)   Total Timed Codes (min):   1:1 Treatment Time:       TREATMENT AREA = Right knee pain [M25.561]    SUBJECTIVE    Pain Level (on 0 to 10 scale):  0  / 10   Medication Changes/New allergies or changes in medical history, any new surgeries or procedures? NO    If yes, update Summary List   Subjective Functional Status/Changes:  []  No changes reported     \"It's getting a little better. \"       OBJECTIVE    30 min Therapeutic Exercise:  [x]  See flow sheet   Rationale:      increase ROM, increase strength, improve coordination, improve balance and increase proprioception to improve the patients ability to walk       15 min Manual Therapy:  DTM R gastroc, hamstring, quad, patellar mobs, PROM flex/ext   Rationale:      decrease pain, increase ROM, increase tissue extensibility and decrease edema  to improve patient's ability to walk      10 min Neuromuscular Re-ed: See flow sheet   Rationale:      increase strength, improve coordination, improve balance and increase proprioception to improve the patients ability to walk      min Patient Education:  YES  Reviewed HEP   []  Progressed/Changed HEP based on:         Other Objective/Functional Measures:    AROM: -3 to 95 degrees     Post Treatment Pain Level (on 0 to 10) scale:   0  / 10     ASSESSMENT    Assessment/Changes in Function:     See PN     []  See Progress Note/Recertification   Patient will continue to benefit from skilled PT services to modify and progress therapeutic interventions, address functional mobility deficits, address ROM deficits, address strength deficits, analyze and address soft tissue restrictions, analyze and cue movement patterns, analyze and modify body mechanics/ergonomics, assess and modify postural abnormalities and instruct in home and community integration to attain remaining goals. to attain remaining goals.    Progress toward goals / Updated goals:    See PN     PLAN    []  Upgrade activities as tolerated YES Continue plan of care   []  Discharge due to :    []  Other:      Therapist: Herbert Holloway PT    Date: 4/10/2017 Time: 12:06 PM   Future Appointments  Date Time Provider Michael Jesus   4/10/2017 12:30 PM Herbert Holloway PT [facility] AT [facility]   4/12/2017 11:30 AM Bhargav Ndiaye, PTA REH[facility]ENTER AT [facility]   4/14/2017 12:00 PM Herbert Holloway, PT [facility]ENTER AT [facility]   4/17/2017 12:30 PM Bhargav Ndiaye PTA [facility] AT [facility]   4/19/2017 12:00 PM Bhargav Ndiaye PTA [facility] AT [facility]   4/21/2017 12:00 PM Herbert Holloway PT [facility] AT [facility]   4/24/2017 10:30 AM Herbert Holloway, PT [facility] AT [facility]   4/26/2017 12:30 PM Herbert Holloway, PT [facility] AT [facility]

## 2017-04-12 ENCOUNTER — HOSPITAL ENCOUNTER (OUTPATIENT)
Dept: PHYSICAL THERAPY | Age: 72
Discharge: HOME | End: 2017-04-12
Payer: MEDICAID

## 2017-04-12 PROCEDURE — 97112 NEUROMUSCULAR REEDUCATION: CPT

## 2017-04-12 PROCEDURE — 97140 MANUAL THERAPY 1/> REGIONS: CPT

## 2017-04-12 PROCEDURE — 97110 THERAPEUTIC EXERCISES: CPT

## 2017-04-12 NOTE — PROGRESS NOTES
PHYSICAL THERAPY - DAILY TREATMENT NOTE    Patient Name: Mario Hawkins        Date: 2017  : 1945   YES Patient  Verified  Visit #:     Insurance: Payor: Nonda Rocher / Plan: [place] [employer] / Product Type: Managed Care Medicaid /      In time: 8064 Out time: 83   Total Treatment Time: 70     TREATMENT AREA =  Right knee pain [M25.561]    SUBJECTIVE  Pain Level (on 0 to 10 scale):  0  / 10   Medication Changes/New allergies or changes in medical history, any new surgeries or procedures?     NO    If yes, update Summary List   Subjective Functional Status/Changes:  []  No changes reported     Functional improvements:   Functional impairments: My left hip still hurts sometimes        OBJECTIVE  Modalities Rationale:     decrease edema, decrease inflammation and decrease pain to improve patient's ability to prolong walking   min [] Estim, type/location:                                      []  att     []  unatt     []  w/US     []  w/ice    []  w/heat    min []  Mechanical Traction: type/lbs                   []  pro   []  sup   []  int   []  cont    []  before manual    []  after manual    min []  Ultrasound, settings/location:      min []  Iontophoresis w/ dexamethasone, location:                                               []  take home patch       []  in clinic   10 min [x]  Ice     []  Heat    location/position:     min []  Vasopneumatic Device, press/temp:     min []  Other:    [] Skin assessment post-treatment (if applicable):    []  intact    []  redness- no adverse reaction     []redness  adverse reaction:        15 min Manual Therapy: Technique:      [] S/DTM []IASTM []PROM [] Passive Stretching   []manual TPR    []Jt manipulation:Gr I [] II []  III [] IV[] V[]  Treatment Area:     Rationale:      decrease pain, increase ROM and increase tissue extensibility to improve patient's ability to prolong walking     45 min Therapeutic Exercise:  [x]  See flow sheet   Rationale:      increase ROM, increase strength, improve coordination and improve balance to improve the patients ability to prolong walking         min Neuromuscular Re-ed:    Rationale:    increase ROM to improve the patients ability to          min Therapeutic Activity:    Rationale:    increase ROM to improve the patients ability to         min Gait Training:    Rationale:         min Patient Education:  YES  Reviewed HEP   []  Progressed/Changed HEP based on: Other Objective/Functional Measures:         Post Treatment Pain Level (on 0 to 10) scale:   0  / 10     ASSESSMENT  Assessment/Changes in Function:     Patient tolerated treatment well today     []  See Progress Note/Recertification   Patient will continue to benefit from skilled PT services to modify and progress therapeutic interventions, address functional mobility deficits, address ROM deficits, address strength deficits and analyze and address soft tissue restrictions to attain remaining goals.    Progress toward goals / Updated goals:    Established initial HEP     PLAN  []  Upgrade activities as tolerated YES Continue plan of care   []  Discharge due to :    []  Other:      Therapist: Erum Perdomo PTA    Date: 4/12/2017 Time: 3:12 PM     Future Appointments  Date Time Provider Michael Jesus   4/14/2017 12:00 PM Katie Nugent PT [facility] AT [facility]   4/17/2017 12:30 PM Erum Perdomo PTA [facility] AT [facility]   4/19/2017 12:00 PM Erum Perdomo PTA [facility] AT [facility]   4/21/2017 12:00 PM Katie Nugent PT [facility] AT [facility]   4/24/2017 10:30 AM Katie Nugent PT [facility] AT [facility]   4/26/2017 12:30 PM Katie Nugent PT [facility] AT [facility]

## 2017-04-14 ENCOUNTER — HOSPITAL ENCOUNTER (OUTPATIENT)
Dept: PHYSICAL THERAPY | Age: 72
Discharge: HOME | End: 2017-04-14
Payer: MEDICAID

## 2017-04-14 PROCEDURE — 97110 THERAPEUTIC EXERCISES: CPT

## 2017-04-14 PROCEDURE — 97140 MANUAL THERAPY 1/> REGIONS: CPT

## 2017-04-14 NOTE — PROGRESS NOTES
PHYSICAL THERAPY - DAILY TREATMENT NOTE      Patient Name: Carine Roque        Date: 2017  : 1945   YES Patient  Verified  Visit #:   12 2   of   8  Insurance: Payor: BL[employer]OSS MEDICAID / Plan: [address] [place] [address] / Product Type: Managed Care Medicaid /      In time: 12:05 Out time: 1:15   Total Treatment Time: 70     Medicare Time Tracking (below)   Total Timed Codes (min):   1:1 Treatment Time:       TREATMENT AREA = Right knee pain [M25.561]    SUBJECTIVE    Pain Level (on 0 to 10 scale):  0  / 10   Medication Changes/New allergies or changes in medical history, any new surgeries or procedures? NO    If yes, update Summary List   Subjective Functional Status/Changes:  []  No changes reported     \"It's getting better. \"       OBJECTIVE    50 min Therapeutic Exercise:  [x]  See flow sheet   Rationale:      increase ROM, increase strength, improve coordination, improve balance and increase proprioception to improve the patients ability to walk       10 min Manual Therapy:  DTM R quad, hamstrings, gastroc, patellar mobs, PROM flex, ext   Rationale:      decrease pain, increase ROM, increase tissue extensibility and decrease edema  to improve patient's ability to walk    Modalities Rationale:     decrease edema and decrease pain to improve patient's ability to walk      min [] Estim, type/location:                                      []  att     []  unatt     []  w/US     []  w/ice    []  w/heat    min []  Mechanical Traction: type/lbs                   []  pro   []  sup   []  int   []  cont    []  before manual    []  after manual    min []  Ultrasound, settings/location:      min []  Iontophoresis w/ dexamethasone, location:                                               []  take home patch       []  in clinic   10 min [x]  Ice     []  Heat    location/position: R knee; supine    min []  Vasopneumatic Device, press/temp:     min []  Other:    [] Skin assessment post-treatment (if applicable):    [x]  intact    [x]  redness- no adverse reaction     []redness  adverse reaction:       min Patient Education:  YES  Reviewed HEP   []  Progressed/Changed HEP based on: Other Objective/Functional Measures:    Pt prefers to use SPC in R hand, but educated in correct sequencing and use in R hand. AROM R knee extension: 0 deg. Progressed standing exercises. Post Treatment Pain Level (on 0 to 10) scale:   0  / 10     ASSESSMENT    Assessment/Changes in Function:     Significantly improved extension. Improved ambulation with SPC. Good tolerance to standing exercises. []  See Progress Note/Recertification   Patient will continue to benefit from skilled PT services to modify and progress therapeutic interventions, address functional mobility deficits, address ROM deficits, address strength deficits, analyze and address soft tissue restrictions, analyze and cue movement patterns, analyze and modify body mechanics/ergonomics, assess and modify postural abnormalities and instruct in home and community integration to attain remaining goals. to attain remaining goals.    Progress toward goals / Updated goals:    Progressing towards all goals     PLAN    []  Upgrade activities as tolerated YES Continue plan of care   []  Discharge due to :    []  Other:      Therapist: Mehreen Torres PT    Date: 4/14/2017 Time: 12:29 PM   Future Appointments  Date Time Provider Michael Jesus   4/17/2017 12:30 PM Abhijit Youssef PTA [facility] AT [facility]   4/19/2017 12:00 PM Abhijit Youssef PTA [facility]   4/21/2017 12:00 PM Mehreen Torres PT [facility] AT [facility]   4/24/2017 10:30 AM Mehreen Torres PT [facility] AT [facility]   4/26/2017 12:30 PM Mehreen Torres PT [facility] AT [facility]

## 2017-04-17 ENCOUNTER — HOSPITAL ENCOUNTER (OUTPATIENT)
Dept: PHYSICAL THERAPY | Age: 72
Discharge: HOME | End: 2017-04-17
Payer: MEDICAID

## 2017-04-17 PROCEDURE — 97140 MANUAL THERAPY 1/> REGIONS: CPT

## 2017-04-17 PROCEDURE — 97112 NEUROMUSCULAR REEDUCATION: CPT

## 2017-04-17 PROCEDURE — 97110 THERAPEUTIC EXERCISES: CPT

## 2017-04-17 NOTE — PROGRESS NOTES
PHYSICAL THERAPY - DAILY TREATMENT NOTE    Patient Name: Sara Mcduffie        Date: 2017  : 1945   YES Patient  Verified  Visit #:   13   of     Insurance: Payor: [employer] MEDICAID / Plan: [facility] [employer] / Product Type: Managed Care Medicaid /      In time: 4892 Out time: 140   Total Treatment Time: 65     TREATMENT AREA =  Right knee pain [M25.561]    SUBJECTIVE  Pain Level (on 0 to 10 scale):  0  / 10   Medication Changes/New allergies or changes in medical history, any new surgeries or procedures?     NO    If yes, update Summary List   Subjective Functional Status/Changes:  []  No changes reported     Functional improvements: able to walk more, walked 1/4 mile earlier today   Functional impairments:        OBJECTIVE  Modalities Rationale:     decrease edema, decrease inflammation and decrease pain to improve patient's ability to prolong walking   min [] Estim, type/location:                                      []  att     []  unatt     []  w/US     []  w/ice    []  w/heat    min []  Mechanical Traction: type/lbs                   []  pro   []  sup   []  int   []  cont    []  before manual    []  after manual    min []  Ultrasound, settings/location:      min []  Iontophoresis w/ dexamethasone, location:                                               []  take home patch       []  in clinic   10 min [x]  Ice     []  Heat    location/position:     min []  Vasopneumatic Device, press/temp:     min []  Other:    [] Skin assessment post-treatment (if applicable):    []  intact    []  redness- no adverse reaction     []redness  adverse reaction:        15 min Manual Therapy: Technique:      [] S/DTM []IASTM []PROM [] Passive Stretching   []manual TPR    []Jt manipulation:Gr I [] II []  III [] IV[] V[]  Treatment Area:     Rationale:      decrease pain, increase ROM and increase tissue extensibility to improve patient's ability to prolong walking     40 min Therapeutic Exercise:  [x] See flow sheet   Rationale:      increase ROM, increase strength, improve coordination and improve balance to improve the patients ability to prolong walking         min Neuromuscular Re-ed:    Rationale:    increase ROM to improve the patients ability to          min Therapeutic Activity:    Rationale:    increase ROM to improve the patients ability to         min Gait Training:    Rationale:         min Patient Education:  YES  Reviewed HEP   []  Progressed/Changed HEP based on: Other Objective/Functional Measures: Added ROM on foam with EC, SLR,      Post Treatment Pain Level (on 0 to 10) scale:   0  / 10     ASSESSMENT  Assessment/Changes in Function:     Patient tolerated treatment well today     []  See Progress Note/Recertification   Patient will continue to benefit from skilled PT services to modify and progress therapeutic interventions, address functional mobility deficits, address ROM deficits, address strength deficits and analyze and address soft tissue restrictions to attain remaining goals.    Progress toward goals / Updated goals:    Established initial HEP     PLAN  []  Upgrade activities as tolerated YES Continue plan of care   []  Discharge due to :    []  Other:      Therapist: Barry Wright PTA    Date: 4/17/2017 Time: 3:12 PM     Future Appointments  Date Time Provider Michael Jesus   4/19/2017 12:00 PM Barry Wright PTA [facility]   4/21/2017 12:00 PM Sofía Alexis PT [facility] AT [facility]   4/24/2017 10:30 AM Sofía Alexis PT [facility] AT [facility]   4/26/2017 12:30 PM Sofía Alexis PT [facility] AT [facility]

## 2017-04-19 ENCOUNTER — HOSPITAL ENCOUNTER (OUTPATIENT)
Dept: PHYSICAL THERAPY | Age: 72
Discharge: HOME | End: 2017-04-19
Payer: MEDICAID

## 2017-04-19 PROCEDURE — 97110 THERAPEUTIC EXERCISES: CPT

## 2017-04-19 PROCEDURE — 97112 NEUROMUSCULAR REEDUCATION: CPT

## 2017-04-19 PROCEDURE — 97140 MANUAL THERAPY 1/> REGIONS: CPT

## 2017-04-19 NOTE — PROGRESS NOTES
PHYSICAL THERAPY - DAILY TREATMENT NOTE    Patient Name: Ronald Bautista        Date: 2017  : 1945   YES Patient  Verified  Visit #:   14   of     Insurance: Payor: [employer]OSS MEDICAID / Plan: [facility] [employer] / Product Type: Managed Care Medicaid /      In time: 12 Out time: 5314   Total Treatment Time: 55     TREATMENT AREA =  Right knee pain [M25.561]    SUBJECTIVE  Pain Level (on 0 to 10 scale):  0  / 10   Medication Changes/New allergies or changes in medical history, any new surgeries or procedures?     NO    If yes, update Summary List   Subjective Functional Status/Changes:  []  No changes reported     Functional improvements: able to walk more, walked 1/4 mile earlier today   Functional impairments:        OBJECTIVE  Modalities Rationale:     decrease edema, decrease inflammation and decrease pain to improve patient's ability to prolong walking   min [] Estim, type/location:                                      []  att     []  unatt     []  w/US     []  w/ice    []  w/heat    min []  Mechanical Traction: type/lbs                   []  pro   []  sup   []  int   []  cont    []  before manual    []  after manual    min []  Ultrasound, settings/location:      min []  Iontophoresis w/ dexamethasone, location:                                               []  take home patch       []  in clinic   10 min [x]  Ice     []  Heat    location/position:     min []  Vasopneumatic Device, press/temp:     min []  Other:    [] Skin assessment post-treatment (if applicable):    []  intact    []  redness- no adverse reaction     []redness  adverse reaction:        10 min Manual Therapy: Technique:      [] S/DTM []IASTM []PROM [] Passive Stretching   []manual TPR    []Jt manipulation:Gr I [] II []  III [] IV[] V[]  Treatment Area:     Rationale:      decrease pain, increase ROM and increase tissue extensibility to improve patient's ability to prolong walking     35 min Therapeutic Exercise:  [x] See flow sheet   Rationale:      increase ROM, increase strength, improve coordination and improve balance to improve the patients ability to prolong walking         min Neuromuscular Re-ed:    Rationale:    increase ROM to improve the patients ability to          min Therapeutic Activity:    Rationale:    increase ROM to improve the patients ability to         min Gait Training:    Rationale:         min Patient Education:  YES  Reviewed HEP   []  Progressed/Changed HEP based on: Other Objective/Functional Measures: Added ROM on foam with EC, SLR,      Post Treatment Pain Level (on 0 to 10) scale:   0  / 10     ASSESSMENT  Assessment/Changes in Function:     Patient tolerated treatment well today     []  See Progress Note/Recertification   Patient will continue to benefit from skilled PT services to modify and progress therapeutic interventions, address functional mobility deficits, address ROM deficits, address strength deficits and analyze and address soft tissue restrictions to attain remaining goals.    Progress toward goals / Updated goals:    Established initial HEP     PLAN  []  Upgrade activities as tolerated YES Continue plan of care   []  Discharge due to :    []  Other:      Therapist: Nirmala Horn PTA    Date: 4/19/2017 Time: 3:12 PM     Future Appointments  Date Time Provider Michael Jesus   4/21/2017 12:00 PM La Nena Mcmanus PT [facility]   4/24/2017 10:30 AM La Nena Mcmanus PT [facility] AT [facility]   4/26/2017 12:30 PM La Nena Mcmanus PT [facility] AT [facility]

## 2017-04-21 ENCOUNTER — HOSPITAL ENCOUNTER (OUTPATIENT)
Dept: PHYSICAL THERAPY | Age: 72
Discharge: HOME | End: 2017-04-21
Payer: MEDICAID

## 2017-04-21 PROCEDURE — 97110 THERAPEUTIC EXERCISES: CPT

## 2017-04-21 PROCEDURE — 97140 MANUAL THERAPY 1/> REGIONS: CPT

## 2017-04-21 NOTE — PROGRESS NOTES
PHYSICAL THERAPY - DAILY TREATMENT NOTE  -    Patient Name: Akira Hamm        Date: 2017  : 1945   YES Patient  Verified  Visit #:   (69)  5  of   8  Insurance: Payor: [employer]ICA[employer] / Plan: [address] [place] [address] / Product Type: Managed Care Medicaid /      In time: 12:05 Out time: 1:05   Total Treatment Time: 60     Medicare Time Tracking (below)   Total Timed Codes (min):   1:1 Treatment Time:       TREATMENT AREA = Right knee pain [M25.561]    SUBJECTIVE    Pain Level (on 0 to 10 scale):  0  / 10   Medication Changes/New allergies or changes in medical history, any new surgeries or procedures? NO    If yes, update Summary List   Subjective Functional Status/Changes:  []  No changes reported     \"My left hip hurts worse than my knee. \"       OBJECTIVE    40 min Therapeutic Exercise:  [x]  See flow sheet   Rationale:      increase ROM, increase strength, improve coordination, improve balance and increase proprioception to improve the patients ability to walk       10 min Manual Therapy:  DTM R gastroc, hamstrings, quad, patellar mobs, PROM flex and ext   Rationale:      decrease pain, increase ROM, increase tissue extensibility, decrease edema  and decrease trigger points to improve patient's ability to walk    Modalities Rationale:     decrease edema and decrease pain to improve patient's ability to walk      min [] Estim, type/location:                                      []  att     []  unatt     []  w/US     []  w/ice    []  w/heat    min []  Mechanical Traction: type/lbs                   []  pro   []  sup   []  int   []  cont    []  before manual    []  after manual    min []  Ultrasound, settings/location:      min []  Iontophoresis w/ dexamethasone, location:                                               []  take home patch       []  in clinic   10 min [x]  Ice     []  Heat    location/position: R knee; supine    min []  Vasopneumatic Device, press/temp:     min [] Other:    [] Skin assessment post-treatment (if applicable):    [x]  intact    [x]  redness- no adverse reaction     []redness  adverse reaction:       min Patient Education:  YES  Reviewed HEP   []  Progressed/Changed HEP based on: Other Objective/Functional Measures:    Pt ambulating better with SPC in R hand than in L. Likely due to L hip pain. Pt instructed to use SPC in whichever hand makes her feel more steady. Post Treatment Pain Level (on 0 to 10) scale:   0  / 10     ASSESSMENT    Assessment/Changes in Function:     Pt able to reach 0 deg extension after manual treatment. Flexion remains limited with hard end feel. Pt educated in correct mechanics for squatting, focusing on not letting knees translate forward. []  See Progress Note/Recertification   Patient will continue to benefit from skilled PT services to modify and progress therapeutic interventions, address functional mobility deficits, address ROM deficits, address strength deficits, analyze and address soft tissue restrictions, analyze and cue movement patterns, analyze and modify body mechanics/ergonomics, assess and modify postural abnormalities and instruct in home and community integration to attain remaining goals. to attain remaining goals.    Progress toward goals / Updated goals:    Progressing ROM and strength     PLAN    []  Upgrade activities as tolerated YES Continue plan of care   []  Discharge due to :    []  Other:      Therapist: Chioma Graham PT    Date: 4/21/2017 Time: 12:11 PM   Future Appointments  Date Time Provider Michael Jesus   4/24/2017 10:30 AM Chioma Graham PT [facility]   4/26/2017 12:30 PM Chioma Graham PT [facility] AT [facility]

## 2017-04-24 ENCOUNTER — APPOINTMENT (OUTPATIENT)
Dept: PHYSICAL THERAPY | Age: 72
End: 2017-04-24
Payer: MEDICAID

## 2017-04-26 ENCOUNTER — HOSPITAL ENCOUNTER (OUTPATIENT)
Dept: PHYSICAL THERAPY | Age: 72
Discharge: HOME | End: 2017-04-26
Payer: MEDICAID

## 2017-04-26 PROCEDURE — 97110 THERAPEUTIC EXERCISES: CPT

## 2017-04-26 PROCEDURE — 97140 MANUAL THERAPY 1/> REGIONS: CPT

## 2017-04-26 NOTE — PROGRESS NOTES
PHYSICAL THERAPY - DAILY TREATMENT NOTE  -    Patient Name: Sara Mcduffie        Date: 2017  : 1945   YES Patient  Verified  Visit #:   (96) 6   of   8  Insurance: Payor: BL[employer]OSS MEDICAID / Plan: [address] [place] [address] / Product Type: Managed Care Medicaid /      In time: 12:35 Out time: 1:35   Total Treatment Time: 60     Medicare Time Tracking (below)   Total Timed Codes (min):   1:1 Treatment Time:       TREATMENT AREA = Right knee pain [M25.561]    SUBJECTIVE    Pain Level (on 0 to 10 scale):  0  / 10   Medication Changes/New allergies or changes in medical history, any new surgeries or procedures? NO    If yes, update Summary List   Subjective Functional Status/Changes:  []  No changes reported     \"I was able to get in the [place] stepping up with my L leg. \"       OBJECTIVE    40 min Therapeutic Exercise:  [x]  See flow sheet   Rationale:      increase ROM, increase strength, improve coordination, improve balance and increase proprioception to improve the patients ability to walk       10 min Manual Therapy:  DTM R gastroc, hamstrings, quad, patellar mobs, PROM flex and ext, retrograde massage   Rationale:      decrease pain, increase ROM, increase tissue extensibility, decrease edema  and decrease trigger points to improve patient's ability to walk    Modalities Rationale:     decrease edema and decrease pain to improve patient's ability to walk      min [] Estim, type/location:                                      []  att     []  unatt     []  w/US     []  w/ice    []  w/heat    min []  Mechanical Traction: type/lbs                   []  pro   []  sup   []  int   []  cont    []  before manual    []  after manual    min []  Ultrasound, settings/location:      min []  Iontophoresis w/ dexamethasone, location:                                               []  take home patch       []  in clinic   10 min [x]  Ice     []  Heat    location/position: L knee; supine    min [] Vasopneumatic Device, press/temp:     min []  Other:    [] Skin assessment post-treatment (if applicable):    [x]  intact    [x]  redness- no adverse reaction     []redness  adverse reaction:       min Patient Education:  YES  Reviewed HEP   []  Progressed/Changed HEP based on: Other Objective/Functional Measures:    AROM flex: 106 deg     Post Treatment Pain Level (on 0 to 10) scale:   0  / 10     ASSESSMENT    Assessment/Changes in Function:     ROM and ambulation endurance are improving. Held a few exercises today due to pt not having shorts today. Resume next session. []  See Progress Note/Recertification   Patient will continue to benefit from skilled PT services to modify and progress therapeutic interventions, address functional mobility deficits, address ROM deficits, address strength deficits, analyze and address soft tissue restrictions, analyze and cue movement patterns, analyze and modify body mechanics/ergonomics, assess and modify postural abnormalities, address imbalance/dizziness and instruct in home and community integration to attain remaining goals. to attain remaining goals.    Progress toward goals / Updated goals:    Progressing ROM     PLAN    []  Upgrade activities as tolerated YES Continue plan of care   []  Discharge due to :    []  Other:      Therapist: Allison Nugent PT    Date: 4/26/2017 Time: 1:14 PM   Future Appointments  Date Time Provider Michael Jesus   5/1/2017 12:00 PM Caroline Ramachandran PT [facility]   5/3/2017 12:30 PM Allison Nugent PT [facility]   5/5/2017 12:00 PM Allison Nugent PT [facility]   5/8/2017 12:30 PM Allison Nugent PT [facility]   5/12/2017 12:00 PM Allison Nugent PT [facility]   5/15/2017 12:30 PM Allison Nugent PT [facility]   5/17/2017 12:30 PM Allison Nugent PT [facility]   5/19/2017 12:00 PM Allison Nugent PT [facility]

## 2017-05-01 ENCOUNTER — HOSPITAL ENCOUNTER (OUTPATIENT)
Dept: PHYSICAL THERAPY | Age: 72
Discharge: HOME | End: 2017-05-01
Payer: MEDICAID

## 2017-05-01 PROCEDURE — 97140 MANUAL THERAPY 1/> REGIONS: CPT

## 2017-05-01 PROCEDURE — 97110 THERAPEUTIC EXERCISES: CPT

## 2017-05-01 NOTE — PROGRESS NOTES
PHYSICAL THERAPY - DAILY TREATMENT NOTE    Patient Name: Harris Braun        Date: 2017  : 1945   YES Patient  Verified  Visit #:   (68) 7   ok   8  Insurance: Payor: [id number] [employer] / Plan: [address] [place] [address] / Product Type: Managed Care Medicaid /      In time:  Out time: 1250   Total Treatment Time: 55     Medicare Time Tracking (below)   Total Timed Codes (min):  45 1:1 Treatment Time:  45     TREATMENT AREA = Right knee pain [M25.561]    SUBJECTIVE  Pain Level (on 0 to 10 scale):  0  / 10   Medication Changes/New allergies or changes in medical history, any new surgeries or procedures? NO    If yes, update Summary List   Subjective Functional Status/Changes:  []  No changes reported     No pain right now.   Still gets pain after sitting too long        OBJECTIVE    35 min Therapeutic Exercise:  [x]  See flow sheet   Rationale:      increase ROM, increase strength and improve coordination to improve the patients ability to amb/ perform transfers     10 min Manual Therapy: Technique:      [x] STM[]IASTM[]TPR[]PROM[x] Stretching- knee flex  [] SOR[] man traction[x] patellar mobes  []OP with REIL  []Jt manipulation []Gr I [] II []  III [] IV[] V[]    Treatment Area: R knee    Rationale:      decrease pain, increase ROM, increase tissue extensibility and decrease trigger points to improve patient's ability to amb with improved mechanics        Modalities Rationale:     decrease edema and decrease pain to improve patient's ability to amb with less pain   min [] Estim, type/location:                                      []  att     []  unatt     []  w/US     []  w/ice    []  w/heat    min []  Mechanical Traction: type/lbs                   []  pro   []  sup   []  int   []  cont    []  before manual    []  after manual    min []  Ultrasound, settings/location:      min []  Iontophoresis w/ dexamethasone, location:                                               []  take home patch       []  in clinic   10 min [x]  Ice     []  Heat    location/position:     min []  Vasopneumatic Device, press/temp:     min []  Other:    [x] Skin assessment post-treatment (if applicable):    [x]  intact    [x]  redness- no adverse reaction     []redness  adverse reaction:         min Gait Training:    Rationale:        throughout Rx min Patient Education:  YES  Reviewed HEP   []  Progressed/Changed HEP based on: Other Objective/Functional Measures:    Flex- 111 deg. Added stair with use of rail in reciprocal pattern     Post Treatment Pain Level (on 0 to 10) scale:   0  / 10     ASSESSMENT  Assessment/Changes in Function:     Functional improvement:  Inc ROM for improved gait mechanics  Functional limitation:  Pain after prolonged sit       []  See Progress Note/Recertification   Patient will continue to benefit from skilled PT services to modify and progress therapeutic interventions, address functional mobility deficits, address ROM deficits, address strength deficits, analyze and address soft tissue restrictions, analyze and cue movement patterns, address imbalance/dizziness and instruct in home and community integration to attain remaining goals. Progress toward goals / Updated goals:     Inc flex ROM     PLAN  [x]  Upgrade activities as tolerated YES Continue plan of care   []  Discharge due to :    []  Other:      Therapist: Nunu Garcia PT    Date: 5/1/2017 Time: 11:53 AM     Future Appointments  Date Time Provider Michael Jesus   5/1/2017 12:00 PM Nunu Garcia PT [facility]   5/3/2017 12:30 PM Chioma Graham, PT [facility]   5/5/2017 12:00 PM Chioma Graham, PT [facility]   5/8/2017 12:30 PM Nunu Garcia PT [facility]   5/12/2017 12:00 PM Chiomalorna Graham, PT [facility]   5/15/2017 12:30 PM Chiomalorna Graham, PT [facility]   5/17/2017 12:30 PM Chioma Graham, PT [facility]   5/19/2017 12:00 PM Chioma Graham, PT [facility] AT [facility]

## 2017-05-03 ENCOUNTER — APPOINTMENT (OUTPATIENT)
Dept: PHYSICAL THERAPY | Age: 72
End: 2017-05-03
Payer: MEDICAID

## 2017-05-05 ENCOUNTER — APPOINTMENT (OUTPATIENT)
Dept: PHYSICAL THERAPY | Age: 72
End: 2017-05-05
Payer: MEDICAID

## 2017-05-08 ENCOUNTER — APPOINTMENT (OUTPATIENT)
Dept: PHYSICAL THERAPY | Age: 72
End: 2017-05-08
Payer: MEDICAID

## 2017-05-11 ENCOUNTER — HOSPITAL ENCOUNTER (OUTPATIENT)
Dept: PHYSICAL THERAPY | Age: 72
Discharge: HOME | End: 2017-05-11
Payer: MEDICAID

## 2017-05-11 PROCEDURE — 97112 NEUROMUSCULAR REEDUCATION: CPT

## 2017-05-11 PROCEDURE — 97140 MANUAL THERAPY 1/> REGIONS: CPT

## 2017-05-11 PROCEDURE — 97110 THERAPEUTIC EXERCISES: CPT

## 2017-05-11 NOTE — PROGRESS NOTES
PHYSICAL THERAPY - DAILY TREATMENT NOTE    Patient Name: Desean Esposito        Date: 2017  : 1945   YES Patient  Verified  Visit #:   (60) 8   of   8  Insurance: Payor: [id number] [facility] / Plan: [address] [place] [address] / Product Type: Managed Care Medicaid /      In time: 9553 Out time: 468   Total Treatment Time: 50     Medicare Time Tracking (below)   Total Timed Codes (min):  40 1:1 Treatment Time:  40     TREATMENT AREA = Right knee pain [M25.561]    SUBJECTIVE  Pain Level (on 0 to 10 scale):  0  / 10   Medication Changes/New allergies or changes in medical history, any new surgeries or procedures?     NO    If yes, update Summary List   Subjective Functional Status/Changes:  []  No changes reported     Walking better        OBJECTIVE    20 min Therapeutic Exercise:  [x]  See flow sheet   Rationale:      increase ROM and increase strength to improve the patients ability to amb with improved mechanics     10 min Manual Therapy: Technique:      [] STM[]IASTM[]TPR[x]PROM- knee flex/ ext[] Stretching  [] SOR[] man traction[x] patellar mobes  []OP with REIL  []Jt manipulation []Gr I [] II []  III [] IV[] V[]    Treatment Area:  R knee   Rationale:      decrease pain, increase ROM and increase tissue extensibility to improve patient's ability to amb with improved mechanics    10 min Neuromuscular Re-ed: [x]  See flow sheet   Rationale:    improve coordination and improve balance to improve the patients ability to dec risk for falls      Modalities Rationale: decrease edema and decrease pain to improve patient's ability to amb with less pain                min [] Estim, type/location:      [] att [] unatt [] w/US [] w/ice [] w/heat     min [] Mechanical Traction: type/lbs     [] pro [] sup [] int [] cont [] before manual [] after manual     min [] Ultrasound, settings/location:        min [] Iontophoresis w/ dexamethasone, location:     [] take home patch [] in clinic   10 min [x]  Ice [] Heat location/position:       min [] Vasopneumatic Device, press/temp:       min [] Other:     [x] Skin assessment post-treatment (if applicable):   [x] intact [x] redness- no adverse reaction   []redness  adverse reaction:        throughout Rx min Patient Education:  YES  Reviewed HEP   []  Progressed/Changed HEP based on: Other Objective/Functional Measures:    ROM= - 3 to 110    Ascends/ descents stairs using 2 rails in reciprocal pattern indep    FOTO= 52%     Post Treatment Pain Level (on 0 to 10) scale:   0  / 10     ASSESSMENT  Assessment/Changes in Function:     Functional improvement:  Improved gait mechanics and balance. Able to negotiate stairs with rail in reciprocal pattern   Functional limitation:  Limited end range ext, affecting gait mechanics at HS      []  See Progress Note/Recertification   Patient will continue to benefit from skilled PT services to modify and progress therapeutic interventions, address functional mobility deficits, address ROM deficits, address strength deficits, analyze and address soft tissue restrictions, analyze and cue movement patterns, address imbalance/dizziness and instruct in home and community integration to attain remaining goals.    Progress toward goals / Updated goals:    Meeting flex ROM for new LTG 1  Meeting new LTG 3     PLAN  [x]  Upgrade activities as tolerated YES Continue plan of care   []  Discharge due to :    []  Other:      Therapist: Cali Hernadez PT    Date: 5/11/2017 Time: 10:32 AM     Future Appointments  Date Time Provider Michael Jesus   5/11/2017 11:00 AM Cali Hernadez PT [facility]   5/12/2017 12:00 PM Ines Mckinney PT [facility]   5/15/2017 12:30 PM Ines Mckinney PT [facility]   5/17/2017 12:30 PM Ines Mckinney PT [facility]   5/19/2017 12:00 PM nIes Mckinney PT [facility] AT [facility]

## 2017-05-12 ENCOUNTER — HOSPITAL ENCOUNTER (OUTPATIENT)
Dept: PHYSICAL THERAPY | Age: 72
Discharge: HOME | End: 2017-05-12
Payer: MEDICAID

## 2017-05-12 PROCEDURE — 97110 THERAPEUTIC EXERCISES: CPT

## 2017-05-12 PROCEDURE — 97140 MANUAL THERAPY 1/> REGIONS: CPT

## 2017-05-12 NOTE — PROGRESS NOTES
PHYSICAL THERAPY - DAILY TREATMENT NOTE      Patient Name: Harris Braun        Date: 2017  : 1945   YES Patient  Verified  Visit #:   19) 1   of   4  Insurance: Payor: BLUE CROSS MEDICAID / Plan: [address] [place] [address] / Product Type: Managed Care Medicaid /      In time: 12:05 Out time: 1:15   Total Treatment Time: 60     Medicare Time Tracking (below)   Total Timed Codes (min):   1:1 Treatment Time:       TREATMENT AREA = Right knee pain [M25.561]    SUBJECTIVE    Pain Level (on 0 to 10 scale):  0  / 10   Medication Changes/New allergies or changes in medical history, any new surgeries or procedures? NO    If yes, update Summary List   Subjective Functional Status/Changes:  []  No changes reported     Daughter reports pt doesn't walk with her knee straight.        OBJECTIVE    40 min Therapeutic Exercise:  [x]  See flow sheet   Rationale:      increase ROM, increase strength, improve coordination, improve balance and increase proprioception to improve the patients ability to walk       10 min Manual Therapy:  DTM gastroc, hamstrings, patellar mobs, PROM flex, ext   Rationale:      decrease pain, increase ROM, increase tissue extensibility and decrease edema  to improve patient's ability to walk    Modalities Rationale:     decrease pain to improve patient's ability to walk      min [] Estim, type/location:                                      []  att     []  unatt     []  w/US     []  w/ice    []  w/heat    min []  Mechanical Traction: type/lbs                   []  pro   []  sup   []  int   []  cont    []  before manual    []  after manual    min []  Ultrasound, settings/location:      min []  Iontophoresis w/ dexamethasone, location:                                               []  take home patch       []  in clinic   10 min [x]  Ice     []  Heat    location/position: R knee; supine    min []  Vasopneumatic Device, press/temp:     min []  Other:    [] Skin assessment post-treatment (if applicable):    [x]  intact    [x]  redness- no adverse reaction     []redness  adverse reaction:       min Patient Education:  YES  Reviewed HEP   []  Progressed/Changed HEP based on: Other Objective/Functional Measures:    See PN     Post Treatment Pain Level (on 0 to 10) scale:   0  / 10     ASSESSMENT    Assessment/Changes in Function:     See PN     []  See Progress Note/Recertification   Patient will continue to benefit from skilled PT services to modify and progress therapeutic interventions, address functional mobility deficits, address ROM deficits, address strength deficits, analyze and address soft tissue restrictions, analyze and cue movement patterns, analyze and modify body mechanics/ergonomics, assess and modify postural abnormalities and instruct in home and community integration to attain remaining goals. to attain remaining goals.    Progress toward goals / Updated goals:    See PN     PLAN    []  Upgrade activities as tolerated YES Continue plan of care   []  Discharge due to :    []  Other:      Therapist: Mehreen Torres PT    Date: 5/12/2017 Time: 12:12 PM   Future Appointments  Date Time Provider Michael Jesus   5/15/2017 12:30 PM Mehreen Torres PT [facility]   5/17/2017 12:30 PM Mehreen Torres PT [facility]   5/19/2017 12:00 PM Mehreen Torres PT [facility]

## 2017-05-15 ENCOUNTER — APPOINTMENT (OUTPATIENT)
Dept: PHYSICAL THERAPY | Age: 72
End: 2017-05-15
Payer: MEDICAID

## 2017-05-17 ENCOUNTER — APPOINTMENT (OUTPATIENT)
Dept: PHYSICAL THERAPY | Age: 72
End: 2017-05-17
Payer: MEDICAID

## 2017-05-19 ENCOUNTER — APPOINTMENT (OUTPATIENT)
Dept: PHYSICAL THERAPY | Age: 72
End: 2017-05-19
Payer: MEDICAID

## 2017-07-03 NOTE — PROGRESS NOTES
[address]   PHYSICAL THERAPY AT [address] [place] [address], [address] [place] [address], [address], [address] [place] [address]  Phone: [phone]  Fax: [phone] SUMMARY  Patient Name: Cesar Del Cid : 1945   Treatment/Medical Diagnosis: Right knee pain [M25.561]   Referral Source: Richar Arreaga DO     Date of Initial Visit: 3/17/17 Attended Visits: 19 Missed Visits: 0     SUMMARY OF TREATMENT  Physical therapy has consisted of therapeutic exercise and neuromuscular re-education for R knee ROM, LE strengthening, balance, and gait training, manual therapy including STM, DTM, joint mobs, PROM, pt education for activity modification, gait mechanics, and HEP. CURRENT STATUS  Pt did not return to PT after 19th visit on 17. She was re-assessed at that time and had made great progress with ROM, strength, and ambulation. Pt called to cancel last 3 appointments due to feeling better and continuing with HEP at home. Information below is based on last re-assessment. Previous Goals:  1. Pt will demonstrate R knee AROM 0-110  2. Pt will ambulate 500 feet independently using rollator walker for community ambulation  3. Pt will be able to negotiate 3 steps independently   4. Pt will be able to transfer into and out of the car independently                              Prior Level/Current Level:  1) Prior Level: -3 to 95 degrees                           Current Level: 0-105 degrees after stretching; PROM flex: 110                           Goal Met? progressing  2) Prior Level: n/a                           Current Level: ambulating at least 500 feet with SPC                           Goal Met? yes  3) Prior Level: n/a                           Current Level: not assessed                           Goal Met? ongoing  4) Prior Level: unable                           Current Level: able to transfer into car with regular step height independently                           Goal Met? yes     RECOMMENDATIONS  Discontinue therapy. Progressing towards or have reached established goals. If you have any questions/comments please contact us directly at [phone]. Thank you for allowing us to assist in the care of your patient.     Therapist Signature: Kike Love PT, DPT Date: 7/3/17     Time: 2:15 PM